# Patient Record
Sex: MALE | Race: BLACK OR AFRICAN AMERICAN | URBAN - METROPOLITAN AREA
[De-identification: names, ages, dates, MRNs, and addresses within clinical notes are randomized per-mention and may not be internally consistent; named-entity substitution may affect disease eponyms.]

---

## 2017-07-01 ENCOUNTER — APPOINTMENT (OUTPATIENT)
Dept: GENERAL RADIOLOGY | Facility: CLINIC | Age: 27
End: 2017-07-01
Attending: FAMILY MEDICINE

## 2017-07-01 ENCOUNTER — HOSPITAL ENCOUNTER (EMERGENCY)
Facility: CLINIC | Age: 27
Discharge: HOME | End: 2017-07-01
Attending: FAMILY MEDICINE | Admitting: FAMILY MEDICINE

## 2017-07-01 VITALS
HEIGHT: 73 IN | SYSTOLIC BLOOD PRESSURE: 120 MMHG | TEMPERATURE: 98 F | DIASTOLIC BLOOD PRESSURE: 65 MMHG | WEIGHT: 145 LBS | BODY MASS INDEX: 19.22 KG/M2 | RESPIRATION RATE: 18 BRPM | HEART RATE: 99 BPM | OXYGEN SATURATION: 99 %
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
## 2017-07-01 DIAGNOSIS — S63.613A SPRAIN OF LEFT MIDDLE FINGER, UNSPECIFIED SITE OF FINGER, INITIAL ENCOUNTER: ICD-10-CM

## 2017-07-01 PROCEDURE — 73130 X-RAY EXAM OF HAND: CPT | Mod: LT

## 2017-07-01 PROCEDURE — 99283 EMERGENCY DEPT VISIT LOW MDM: CPT | Mod: Z6 | Performed by: FAMILY MEDICINE

## 2017-07-01 PROCEDURE — 99283 EMERGENCY DEPT VISIT LOW MDM: CPT | Performed by: FAMILY MEDICINE

## 2017-07-01 RX ORDER — IBUPROFEN 800 MG/1
800 TABLET, FILM COATED ORAL EVERY 8 HOURS PRN
Qty: 15 TABLET | Refills: 0 | Status: SHIPPED | OUTPATIENT
Start: 2017-07-01 | End: 2017-07-09

## 2017-07-01 NOTE — ED AVS SNAPSHOT
[facility], Emergency Department    [address]    [place] [address]    Phone:  [phone]    Fax:  [phone]                                       Mr. Theo Messer   MRN: [id number]    Department:  [facility], Emergency Department   Date of Visit:  7/1/2017           After Visit Summary Signature Page     I have received my discharge instructions, and my questions have been answered. I have discussed any challenges I see with this plan with the nurse or doctor.    ..........................................................................................................................................  Patient/Patient Representative Signature      ..........................................................................................................................................  Patient Representative Print Name and Relationship to Patient    ..................................................               ................................................  Date                                            Time    ..........................................................................................................................................  Reviewed by Signature/Title    ...................................................              ..............................................  Date                                                            Time

## 2017-07-01 NOTE — ED AVS SNAPSHOT
[facility], Emergency Department    [address]    [place] [address]    Phone:  [phone]    Fax:  [phone]                                       Mr. Theo Messer   MRN: [id number]    Department:  [facility], Emergency Department   Date of Visit:  7/1/2017           Patient Information     Date Of Birth          1990        Your diagnoses for this visit were:     Sprain of left middle finger, unspecified site of finger, initial encounter        You were seen by Brock Ball MD.      Follow-up Information     Follow up with primary clinic [facility] [phone].        Discharge Instructions         Finger Sprain  A sprain is a stretching or tearing of the ligaments that hold a joint together. There are no broken bones. Sprains take 3 to 6 weeks to heal.    A sprained finger may be treated with a splint or buddy tape. This is when you tape the injured finger to the one next to it for support. Minor sprains may require no additional support.  Home care    Keep your hand elevated to reduce pain and swelling. This is very important during the first 48 hours.    Apply an ice pack over the injured area for 15 to 20 minutes every 3 to 6 hours. You should do this for the first 24 to 48 hours. You can make an ice pack by filling a plastic bag that seals at the top with ice cubes and then wrapping it with a thin towel. Continue the use of ice packs for relief of pain and swelling as needed. As the ice melts, be careful to avoid getting any wrap or splint wet. After 48 hours, apply heat (warm shower or warm bath) for 15 to 20 minutes several times a day, or alternate ice and heat.    If buddy tape was applied and it becomes wet or dirty, change it. You may replace it with paper, plastic or cloth tape. Cloth tape and paper tapes must be kept dry. Apply gauze or cotton padding between the fingers, especially at the webbed space. This will help prevent the skin from getting moist  and breaking down. Keep the lisa tape in place for at least 4 weeks, or as instructed by your healthcare provider.    If a splint was applied, wear it for the time advised.    You may use over-the-counter pain medicine to control pain, unless another pain medicine was prescribed. If you have chronic liver or kidney disease or ever had a stomach ulcer or GI bleeding, talk with your healthcare provider before using these medicines.  Follow-up care  Follow up with your healthcare provider as directed. Finger joints will become stiff if immobile for too long. If a splint was applied, ask your healthcare provider when it is safe to begin range-of-motion exercises.  Sometimes fractures don t show up on the first X-ray. Bruises and sprains can sometimes hurt as much as a fracture. These injuries can take time to heal completely. If your symptoms don t improve or they get worse, talk with your healthcare provider. You may need a repeat X-ray. If X-rays were taken, you will be told of any new findings that may affect your care.  When to seek medical advice  Call your healthcare provider right away if any of these occur:    Pain or swelling increases    Fingers or hand becomes cold, blue, numb, or tingly  Date Last Reviewed: 11/20/2015 2000-2017 [employer]. [address] [place] [address], [place] [address]. All rights reserved. This information is not intended as a substitute for professional medical care. Always follow your healthcare professional's instructions.          24 Hour Appointment Hotline       To make an appointment at any [facility], call [phone] ([phone]). If you don't have a family doctor or clinic, we will help you find one. [facility] are conveniently located to serve the needs of you and your family.             Review of your medicines      START taking        Dose / Directions Last dose taken    ibuprofen 800 MG tablet   Commonly known as:  ADVIL/MOTRIN   Dose:  800 mg  "  Quantity:  15 tablet        Take 1 tablet (800 mg) by mouth every 8 hours as needed   Refills:  0                Prescriptions were sent or printed at these locations (1 Prescription)                   Other Prescriptions                Printed at Department/Unit printer (1 of 1)         ibuprofen (ADVIL/MOTRIN) 800 MG tablet                Procedures and tests performed during your visit     Hand XR, G/E 3 views, left      Orders Needing Specimen Collection     None      Pending Results     No orders found from 6/29/2017 to 7/2/2017.            Pending Culture Results     No orders found from 6/29/2017 to 7/2/2017.            Pending Results Instructions     If you had any lab results that were not finalized at the time of your Discharge, you can call the ED Lab Result RN at [phone]. You will be contacted by this team for any positive Lab results or changes in treatment. The nurses are available 7 days a week from 10A to 6:30P.  You can leave a message 24 hours per day and they will return your call.        Thank you for choosing [facility]       Thank you for choosing [facility] for your care. Our goal is always to provide you with excellent care. Hearing back from our patients is one way we can continue to improve our services. Please take a few minutes to complete the written survey that you may receive in the mail after you visit with us. Thank you!        [facility] Information     [employer] lets you send messages to your doctor, view your test results, renew your prescriptions, schedule appointments and more. To sign up, go to [email or web address] . Click on \"Log in\" on the left side of the screen, which will take you to the Welcome page. Then click on \"Sign up Now\" on the right side of the page.     You will be asked to enter the access code listed below, as well as some personal information. Please follow the directions to create your username and password.     Your access code is: [email or web address]  Expires: " 2017  8:05 AM     Your access code will  in 90 days. If you need help or a new code, please call your [place] clinic or [phone].        Care EveryWhere ID     This is your Care EveryWhere ID. This could be used by other organizations to access your [facility] medical records  [id number]        Equal Access to Services     BRIAN CHASE : Jon Villegas, watata everett, macarena ruizalmadeleine dey, chung fuller . So [facility] [phone].    ATENCIÓN: Si habla español, tiene a morrison disposición servicios gratuitos de asistencia lingüística. Llame al [phone].    We comply with applicable federal civil rights laws and [place] laws. We do not discriminate on the basis of race, color, national origin, age, disability sex, sexual orientation or gender identity.            After Visit Summary       This is your record. Keep this with you and show to your community pharmacist(s) and doctor(s) at your next visit.

## 2017-07-01 NOTE — DISCHARGE INSTRUCTIONS
Finger Sprain  A sprain is a stretching or tearing of the ligaments that hold a joint together. There are no broken bones. Sprains take 3 to 6 weeks to heal.    A sprained finger may be treated with a splint or buddy tape. This is when you tape the injured finger to the one next to it for support. Minor sprains may require no additional support.  Home care    Keep your hand elevated to reduce pain and swelling. This is very important during the first 48 hours.    Apply an ice pack over the injured area for 15 to 20 minutes every 3 to 6 hours. You should do this for the first 24 to 48 hours. You can make an ice pack by filling a plastic bag that seals at the top with ice cubes and then wrapping it with a thin towel. Continue the use of ice packs for relief of pain and swelling as needed. As the ice melts, be careful to avoid getting any wrap or splint wet. After 48 hours, apply heat (warm shower or warm bath) for 15 to 20 minutes several times a day, or alternate ice and heat.    If buddy tape was applied and it becomes wet or dirty, change it. You may replace it with paper, plastic or cloth tape. Cloth tape and paper tapes must be kept dry. Apply gauze or cotton padding between the fingers, especially at the webbed space. This will help prevent the skin from getting moist and breaking down. Keep the buddy tape in place for at least 4 weeks, or as instructed by your healthcare provider.    If a splint was applied, wear it for the time advised.    You may use over-the-counter pain medicine to control pain, unless another pain medicine was prescribed. If you have chronic liver or kidney disease or ever had a stomach ulcer or GI bleeding, talk with your healthcare provider before using these medicines.  Follow-up care  Follow up with your healthcare provider as directed. Finger joints will become stiff if immobile for too long. If a splint was applied, ask your healthcare provider when it is safe to begin  range-of-motion exercises.  Sometimes fractures don t show up on the first X-ray. Bruises and sprains can sometimes hurt as much as a fracture. These injuries can take time to heal completely. If your symptoms don t improve or they get worse, talk with your healthcare provider. You may need a repeat X-ray. If X-rays were taken, you will be told of any new findings that may affect your care.  When to seek medical advice  Call your healthcare provider right away if any of these occur:    Pain or swelling increases    Fingers or hand becomes cold, blue, numb, or tingly  Date Last Reviewed: 11/20/2015 2000-2017 [employer]. [address] [place] [address], [place] [address]. All rights reserved. This information is not intended as a substitute for professional medical care. Always follow your healthcare professional's instructions.

## 2017-07-05 ASSESSMENT — ENCOUNTER SYMPTOMS
ABDOMINAL PAIN: 0
SHORTNESS OF BREATH: 0
FEVER: 0

## 2017-10-13 ENCOUNTER — HOSPITAL ENCOUNTER (EMERGENCY)
Facility: CLINIC | Age: 27
Discharge: LEFT BEFORE DISCHARGE | End: 2017-10-13
Admitting: EMERGENCY MEDICINE

## 2017-10-13 VITALS
BODY MASS INDEX: 18.92 KG/M2 | SYSTOLIC BLOOD PRESSURE: 115 MMHG | DIASTOLIC BLOOD PRESSURE: 68 MMHG | RESPIRATION RATE: 16 BRPM | WEIGHT: 143.4 LBS | OXYGEN SATURATION: 99 % | TEMPERATURE: 97.9 F | HEART RATE: 83 BPM

## 2017-10-13 DIAGNOSIS — R10.9 STOMACH ACHE: ICD-10-CM

## 2017-10-13 PROCEDURE — 40000268 ZZH STATISTIC NO CHARGES: Performed by: EMERGENCY MEDICINE

## 2017-10-13 NOTE — ED NOTES
Pt was brought to room 2, his heart is 82, pt then stated that is all he wanted to know and requested to leave, pt signed out AMA
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
